# Patient Record
Sex: MALE | Employment: FULL TIME | URBAN - METROPOLITAN AREA
[De-identification: names, ages, dates, MRNs, and addresses within clinical notes are randomized per-mention and may not be internally consistent; named-entity substitution may affect disease eponyms.]

---

## 2017-10-05 ENCOUNTER — OFFICE VISIT (OUTPATIENT)
Dept: INTERNAL MEDICINE CLINIC | Age: 42
End: 2017-10-05

## 2017-10-05 VITALS
BODY MASS INDEX: 25.33 KG/M2 | OXYGEN SATURATION: 98 % | SYSTOLIC BLOOD PRESSURE: 124 MMHG | RESPIRATION RATE: 16 BRPM | HEART RATE: 92 BPM | HEIGHT: 69 IN | WEIGHT: 171 LBS | DIASTOLIC BLOOD PRESSURE: 84 MMHG

## 2017-10-05 DIAGNOSIS — E78.5 DYSLIPIDEMIA: ICD-10-CM

## 2017-10-05 DIAGNOSIS — R03.0 ELEVATED BLOOD PRESSURE READING WITHOUT DIAGNOSIS OF HYPERTENSION: ICD-10-CM

## 2017-10-05 DIAGNOSIS — Z00.00 GENERAL MEDICAL EXAM: ICD-10-CM

## 2017-10-05 DIAGNOSIS — M85.431: Primary | ICD-10-CM

## 2017-10-05 NOTE — PROGRESS NOTES
Coordination of Care Questions    1. Have you been to the ER, urgent care clinic since your last visit? No       Hospitalized since your last visit? No    2. Have you seen or consulted any other health care providers outside of the [address] [place] [address] since your last visit? Include any pap smears or colon screening.  No

## 2017-10-06 LAB
ALBUMIN SERPL-MCNC: 4.8 G/DL (ref 3.5–5.5)
ALBUMIN/GLOB SERPL: 1.5 {RATIO} (ref 1.2–2.2)
ALP SERPL-CCNC: 119 IU/L (ref 39–117)
ALT SERPL-CCNC: 17 IU/L (ref 0–44)
APPEARANCE UR: CLEAR
AST SERPL-CCNC: 18 IU/L (ref 0–40)
BASOPHILS # BLD AUTO: 0 X10E3/UL (ref 0–0.2)
BASOPHILS NFR BLD AUTO: 0 %
BILIRUB SERPL-MCNC: 0.8 MG/DL (ref 0–1.2)
BILIRUB UR QL STRIP: NEGATIVE
BUN SERPL-MCNC: 11 MG/DL (ref 6–24)
BUN/CREAT SERPL: 17 (ref 9–20)
CALCIUM SERPL-MCNC: 9.3 MG/DL (ref 8.7–10.2)
CHLORIDE SERPL-SCNC: 99 MMOL/L (ref 96–106)
CHOLEST SERPL-MCNC: 217 MG/DL (ref 100–199)
CO2 SERPL-SCNC: 24 MMOL/L (ref 18–29)
COLOR UR: YELLOW
CREAT SERPL-MCNC: 0.64 MG/DL (ref 0.76–1.27)
EOSINOPHIL # BLD AUTO: 0 X10E3/UL (ref 0–0.4)
EOSINOPHIL NFR BLD AUTO: 1 %
ERYTHROCYTE [DISTWIDTH] IN BLOOD BY AUTOMATED COUNT: 13.1 % (ref 12.3–15.4)
GLOBULIN SER CALC-MCNC: 3.2 G/DL (ref 1.5–4.5)
GLUCOSE SERPL-MCNC: 89 MG/DL (ref 65–99)
GLUCOSE UR QL: NEGATIVE
HCT VFR BLD AUTO: 48.6 % (ref 37.5–51)
HDLC SERPL-MCNC: 60 MG/DL
HGB BLD-MCNC: 17.1 G/DL (ref 12.6–17.7)
HGB UR QL STRIP: NEGATIVE
IMM GRANULOCYTES # BLD: 0 X10E3/UL (ref 0–0.1)
IMM GRANULOCYTES NFR BLD: 0 %
INTERPRETATION, 910389: NORMAL
KETONES UR QL STRIP: NEGATIVE
LDLC SERPL CALC-MCNC: 137 MG/DL (ref 0–99)
LEUKOCYTE ESTERASE UR QL STRIP: NEGATIVE
LYMPHOCYTES # BLD AUTO: 1.1 X10E3/UL (ref 0.7–3.1)
LYMPHOCYTES NFR BLD AUTO: 22 %
MCH RBC QN AUTO: 30 PG (ref 26.6–33)
MCHC RBC AUTO-ENTMCNC: 35.2 G/DL (ref 31.5–35.7)
MCV RBC AUTO: 85 FL (ref 79–97)
MICRO URNS: NORMAL
MONOCYTES # BLD AUTO: 0.3 X10E3/UL (ref 0.1–0.9)
MONOCYTES NFR BLD AUTO: 5 %
NEUTROPHILS # BLD AUTO: 3.7 X10E3/UL (ref 1.4–7)
NEUTROPHILS NFR BLD AUTO: 72 %
NITRITE UR QL STRIP: NEGATIVE
PH UR STRIP: 6.5 [PH] (ref 5–7.5)
PLATELET # BLD AUTO: 240 X10E3/UL (ref 150–379)
POTASSIUM SERPL-SCNC: 4.1 MMOL/L (ref 3.5–5.2)
PROT SERPL-MCNC: 8 G/DL (ref 6–8.5)
PROT UR QL STRIP: NORMAL
RBC # BLD AUTO: 5.7 X10E6/UL (ref 4.14–5.8)
SODIUM SERPL-SCNC: 141 MMOL/L (ref 134–144)
SP GR UR: 1.02 (ref 1–1.03)
TRIGL SERPL-MCNC: 99 MG/DL (ref 0–149)
UROBILINOGEN UR STRIP-MCNC: 0.2 MG/DL (ref 0.2–1)
VLDLC SERPL CALC-MCNC: 20 MG/DL (ref 5–40)
WBC # BLD AUTO: 5.2 X10E3/UL (ref 3.4–10.8)

## 2017-11-07 ENCOUNTER — TELEPHONE (OUTPATIENT)
Dept: INTERNAL MEDICINE CLINIC | Age: 42
End: 2017-11-07

## 2017-11-07 NOTE — TELEPHONE ENCOUNTER
Patient, requesting clarification regarding rescheduling \"Urinalysis\" test.   Best contact [phone].              From answering service

## 2018-02-19 ENCOUNTER — HOSPITAL ENCOUNTER (OUTPATIENT)
Dept: LAB | Age: 43
Discharge: HOME | End: 2018-02-19

## 2018-02-19 ENCOUNTER — OFFICE VISIT (OUTPATIENT)
Dept: SURGERY | Age: 43
End: 2018-02-19

## 2018-02-19 VITALS
DIASTOLIC BLOOD PRESSURE: 79 MMHG | BODY MASS INDEX: 25.92 KG/M2 | SYSTOLIC BLOOD PRESSURE: 117 MMHG | HEART RATE: 82 BPM | TEMPERATURE: 98.7 F | HEIGHT: 69 IN | RESPIRATION RATE: 14 BRPM | OXYGEN SATURATION: 98 % | WEIGHT: 175 LBS

## 2018-02-19 DIAGNOSIS — D17.21 LIPOMA OF RIGHT UPPER EXTREMITY: Primary | ICD-10-CM

## 2018-02-19 RX ORDER — OXYCODONE AND ACETAMINOPHEN 5; 325 MG/1; MG/1
1 TABLET ORAL
Qty: 30 TAB | Refills: 0 | Status: SHIPPED | OUTPATIENT
Start: 2018-02-19 | End: 2018-12-05 | Stop reason: ALTCHOICE

## 2018-02-19 NOTE — PROGRESS NOTES
1. Have you been to the ER, urgent care clinic since your last visit? Hospitalized since your last visit?no    2. Have you seen or consulted any other health care providers outside of the [address] [place] [address] since your last visit? Include any pap smears or colon screening.  no

## 2018-02-26 NOTE — PROGRESS NOTES
Consult    Subjective:     Jennie De La Cruz is a 43 y.o.  male who is being seen for a tendered subcutaneous mass of his right forearm. Onset of symptoms was gradual and a few years ago with gradually worsening course since that time. The pain is located in his right posterior forearm. Patient describes the pain as continuous and rated as mild. Pain has been associated with nothing. Patient denies fevers, chills and fatigue. Symptoms are aggravated by contact. Symptoms improve with nothing. Previous studies include none. No past medical history on file. Past Surgical History:   Procedure Laterality Date    HX COLONOSCOPY  2/2014    normal     Family History   Problem Relation Age of Onset    Diabetes Father     Cancer Father      polyp colon    Cancer Paternal Grandfather      colon and prostate      Social History   Substance Use Topics    Smoking status: Never Smoker    Smokeless tobacco: Never Used    Alcohol use No       Current Outpatient Prescriptions   Medication Sig Dispense Refill    oxyCODONE-acetaminophen (PERCOCET) 5-325 mg per tablet Take 1 Tab by mouth every four (4) hours as needed for Pain. Max Daily Amount: 6 Tabs. 30 Tab 0    chlorpheniramine-HYDROcodone (TUSSIONEX) 8-10 mg/5 mL suspension Take 5 mL by mouth every twelve (12) hours as needed for Cough. 115 mL 2        No Known Allergies     Review of Systems:  A comprehensive review of systems was negative except for that written in the History of Present Illness. Objective:     Blood pressure 117/79, pulse 82, temperature 98.7 °F (37.1 °C), temperature source Oral, resp. rate 14, height 5' 9\" (1.753 m), weight 175 lb (79.4 kg), SpO2 98 %. Physical Exam:   General appearance: alert, cooperative, no distress, appears stated age  Head: Normocephalic, without obvious abnormality, atraumatic  Eyes: conjunctivae/corneas clear. , EOM's intact.    Extremities: extremities normal, atraumatic, no cyanosis or edema  Skin: Skin color, texture, turgor normal. No rashes, 2 cm subcutaneous mass that is tender on the right posterior arm. It is mobile    Assessment:     43year old with symptomatic lipoma of right posterior.   Would benefit from excision    Plan:     Procedure done in the office     Signed By: April Siegel MD     February 26, 2018

## 2018-02-26 NOTE — PROCEDURES
Procedure Note      Preoperative Diagnosis: Lipoma right arm    Postoperative Diagnosis: Lipoma right arm    Procedure:  Lipoma of right arm      Surgeon:  Murlene Eisenmenger, MD     Assistant:  none    Anesthesia: Local     Procedure Details:      Chart reviewed for the following:   Rogue Habermann, MD, have reviewed the History, Physical and updated the Allergic reactions for Kat performed immediately prior to start of procedure:   Rogue Habermann, MD, have performed the following reviews on Juan Mehta prior to the start of the procedure:            * Patient was identified by name and date of birth   * Agreement on procedure being performed was verified  * Risks and Benefits explained to the patient  * Procedure site verified and marked as necessary  * Patient was positioned for comfort  * Consent was signed and verified     Time: 2:30    Date of procedure: 2/18/2018    The risks,benefits and alternatives  were explained and consent was obtained for the procedure. The area was cleansed with Betadine and draped in the usual manner. Local anesthesia with 1% lidocaine and 0.5% marcaine was infiltrated into the skin overlying the lipoma. An 3 cm  incision was made. The mass was  from the surrounding tissue bluntly. It was removed and sent to pathology. The wound was closed in 2 layers and Dermabond was applied. Estimated Blood Loss:  less than 50 ml    Specimen:  Lipoma             Complications:  None; patient tolerated the procedure well.            Condition: stable           Signed By: Murlene Eisenmenger, MD

## 2018-03-08 ENCOUNTER — OFFICE VISIT (OUTPATIENT)
Dept: SURGERY | Age: 43
End: 2018-03-08

## 2018-03-08 VITALS
DIASTOLIC BLOOD PRESSURE: 74 MMHG | HEIGHT: 69 IN | SYSTOLIC BLOOD PRESSURE: 121 MMHG | HEART RATE: 8 BPM | WEIGHT: 175 LBS | TEMPERATURE: 98.3 F | BODY MASS INDEX: 25.92 KG/M2 | RESPIRATION RATE: 14 BRPM | OXYGEN SATURATION: 98 %

## 2018-03-08 DIAGNOSIS — Z09 POSTOPERATIVE EXAMINATION: Primary | ICD-10-CM

## 2018-03-08 NOTE — PROGRESS NOTES
1. Have you been to the ER, urgent care clinic since your last visit? Hospitalized since your last visit?no    2. Have you seen or consulted any other health care providers outside of the [address] [place] [address] since your last visit? Include any pap smears or colon screening.  no

## 2018-03-15 NOTE — PROGRESS NOTES
SUBJECTIVE: Wesley Garcia is a 43 y.o. male is seen for a routine postop check. Reports no problems with the wound or other issues. Activity, diet and bowels are normal. No pain. OBJECTIVE: Appears well. Wound is well healed without complications or infection. ASSESSMENT: normal postoperative course, doing well. PLAN:  Return PRN.

## 2018-12-05 ENCOUNTER — OFFICE VISIT (OUTPATIENT)
Dept: INTERNAL MEDICINE CLINIC | Age: 43
End: 2018-12-05

## 2018-12-05 VITALS
HEIGHT: 69 IN | BODY MASS INDEX: 25.74 KG/M2 | HEART RATE: 77 BPM | WEIGHT: 173.8 LBS | RESPIRATION RATE: 18 BRPM | SYSTOLIC BLOOD PRESSURE: 120 MMHG | TEMPERATURE: 98 F | OXYGEN SATURATION: 97 % | DIASTOLIC BLOOD PRESSURE: 84 MMHG

## 2018-12-05 DIAGNOSIS — E78.5 DYSLIPIDEMIA: ICD-10-CM

## 2018-12-05 DIAGNOSIS — R03.0 ELEVATED BLOOD PRESSURE READING WITHOUT DIAGNOSIS OF HYPERTENSION: ICD-10-CM

## 2018-12-05 DIAGNOSIS — Z00.00 GENERAL MEDICAL EXAMINATION: Primary | ICD-10-CM

## 2018-12-05 NOTE — PROGRESS NOTES
1. Have you been to the ER, urgent care clinic since your last visit? Hospitalized since your last visit? No    2. Have you seen or consulted any other health care providers outside of the [address] [place] [address] since your last visit? Include any pap smears or colon screening.  No

## 2018-12-05 NOTE — PROGRESS NOTES
Portia Wagoner is here for complete health maintenance physical exam and screening. he does have other concerns. No change diet or exercise    Health maintenance hx includes:  Exercise: not active. Form of exercise: none   Diet: not attempting to follow a low fat, low cholesterol diet  unknown occupation  Cancer screening:    Colon cancer screening:  Last Colonoscopy: never Prostate cancer screening: PSA and/or DIPESH:   No results found for: PSA, PSA2, Ragena Duncans, [id number], [id number], PSALT       Lab Results   Component Value Date/Time    Cholesterol, total 217 (H) 10/05/2017 01:58 PM    HDL Cholesterol 60 10/05/2017 01:58 PM    LDL, calculated 137 (H) 10/05/2017 01:58 PM    VLDL, calculated 20 10/05/2017 01:58 PM    Triglyceride 99 10/05/2017 01:58 PM       Lab Results   Component Value Date/Time    Glucose 89 10/05/2017 01:58 PM         Immunizations:     Immunization History   Administered Date(s) Administered    Influenza High Dose Vaccine PF 11/01/2018    Influenza Vaccine 10/01/2017      Immunization status: up to date and documented.        Social History     Socioeconomic History    Marital status:      Spouse name: Not on file    Number of children: Not on file    Years of education: Not on file    Highest education level: Not on file   Social Needs    Financial resource strain: Not on file    Food insecurity - worry: Not on file    Food insecurity - inability: Not on file    Transportation needs - medical: Not on file   [employer] needs - non-medical: Not on file   Occupational History    Not on file   Tobacco Use    Smoking status: Never Smoker    Smokeless tobacco: Never Used   Substance and Sexual Activity    Alcohol use: No    Drug use: No    Sexual activity: Yes   Other Topics Concern    Not on file   Social History Narrative    Not on file     Past Surgical History:   Procedure Laterality Date    HX COLONOSCOPY  2/2014    normal     Family History Problem Relation Age of Onset    Hypertension Mother     Diabetes Father     Cancer Father         polyp colon    Cancer Paternal Grandfather         colon and prostate     No current outpatient medications on file prior to visit. No current facility-administered medications on file prior to visit. .  Vitals:    12/05/18 1043 12/05/18 1101   BP: 127/88 120/84   Pulse: 77    Resp: 18    Temp: 98 °F (36.7 °C)    TempSrc: Oral    SpO2: 97%    Weight: 173 lb 12.8 oz (78.8 kg)    Height: 5' 9\" (1.753 m)      The physical exam is generally normal. He appears well, alert and oriented x 3, pleasant and cooperative. Vitals as noted. ENT normal, neck supple and free of adenopathy, or masses. No thyromegaly or carotid bruits. Cranial nerves and fundi normal. Lungs are clear to auscultation. Heart sounds are normal, no murmurs, clicks, gallops or rubs. Abdomen is soft, no tenderness, masses or organomegaly. Extremities, peripheral pulses and reflexes are normal.  . Rectal: deferred, not clinically indicated. . Skin is normal without rashes or suspicious lesions. 1. General medical examination    - CBC WITH AUTOMATED DIFF  - LIPID PANEL  - METABOLIC PANEL, COMPREHENSIVE    2.  Dyslipidemia  follow  - LIPID PANEL    3. Elevated blood pressure reading without diagnosis of hypertension  Salt restriction  Home BP advised   diastolic < 80 advised

## 2018-12-06 LAB
ALBUMIN SERPL-MCNC: 4.5 G/DL (ref 3.5–5.5)
ALBUMIN/GLOB SERPL: 1.3 {RATIO} (ref 1.2–2.2)
ALP SERPL-CCNC: 174 IU/L (ref 39–117)
ALT SERPL-CCNC: 51 IU/L (ref 0–44)
AST SERPL-CCNC: 32 IU/L (ref 0–40)
BASOPHILS # BLD AUTO: 0 X10E3/UL (ref 0–0.2)
BASOPHILS NFR BLD AUTO: 0 %
BILIRUB SERPL-MCNC: 0.6 MG/DL (ref 0–1.2)
BUN SERPL-MCNC: 11 MG/DL (ref 6–24)
BUN/CREAT SERPL: 15 (ref 9–20)
CALCIUM SERPL-MCNC: 9.3 MG/DL (ref 8.7–10.2)
CHLORIDE SERPL-SCNC: 102 MMOL/L (ref 96–106)
CHOLEST SERPL-MCNC: 191 MG/DL (ref 100–199)
CO2 SERPL-SCNC: 24 MMOL/L (ref 20–29)
CREAT SERPL-MCNC: 0.71 MG/DL (ref 0.76–1.27)
EOSINOPHIL # BLD AUTO: 0.1 X10E3/UL (ref 0–0.4)
EOSINOPHIL NFR BLD AUTO: 2 %
ERYTHROCYTE [DISTWIDTH] IN BLOOD BY AUTOMATED COUNT: 12.9 % (ref 12.3–15.4)
GLOBULIN SER CALC-MCNC: 3.5 G/DL (ref 1.5–4.5)
GLUCOSE SERPL-MCNC: 84 MG/DL (ref 65–99)
HCT VFR BLD AUTO: 49.6 % (ref 37.5–51)
HDLC SERPL-MCNC: 55 MG/DL
HGB BLD-MCNC: 17 G/DL (ref 13–17.7)
IMM GRANULOCYTES # BLD: 0 X10E3/UL (ref 0–0.1)
IMM GRANULOCYTES NFR BLD: 0 %
INTERPRETATION, 910389: NORMAL
LDLC SERPL CALC-MCNC: 114 MG/DL (ref 0–99)
LYMPHOCYTES # BLD AUTO: 1.3 X10E3/UL (ref 0.7–3.1)
LYMPHOCYTES NFR BLD AUTO: 22 %
MCH RBC QN AUTO: 30.2 PG (ref 26.6–33)
MCHC RBC AUTO-ENTMCNC: 34.3 G/DL (ref 31.5–35.7)
MCV RBC AUTO: 88 FL (ref 79–97)
MONOCYTES # BLD AUTO: 0.3 X10E3/UL (ref 0.1–0.9)
MONOCYTES NFR BLD AUTO: 6 %
NEUTROPHILS # BLD AUTO: 4.2 X10E3/UL (ref 1.4–7)
NEUTROPHILS NFR BLD AUTO: 70 %
PLATELET # BLD AUTO: 263 X10E3/UL (ref 150–379)
POTASSIUM SERPL-SCNC: 4 MMOL/L (ref 3.5–5.2)
PROT SERPL-MCNC: 8 G/DL (ref 6–8.5)
RBC # BLD AUTO: 5.62 X10E6/UL (ref 4.14–5.8)
SODIUM SERPL-SCNC: 142 MMOL/L (ref 134–144)
TRIGL SERPL-MCNC: 110 MG/DL (ref 0–149)
VLDLC SERPL CALC-MCNC: 22 MG/DL (ref 5–40)
WBC # BLD AUTO: 6 X10E3/UL (ref 3.4–10.8)

## 2018-12-09 DIAGNOSIS — R74.8 ABNORMAL LIVER ENZYMES: Primary | ICD-10-CM

## 2018-12-14 LAB
HBV CORE AB SERPL QL IA: NEGATIVE
HBV SURFACE AG SERPL QL IA: NEGATIVE
HCV AB S/CO SERPL IA: <0.1 S/CO RATIO (ref 0–0.9)
SPECIMEN STATUS REPORT, ROLRST: NORMAL

## 2018-12-18 ENCOUNTER — HOSPITAL ENCOUNTER (OUTPATIENT)
Dept: ULTRASOUND IMAGING | Age: 43
Discharge: HOME | End: 2018-12-18
Payer: COMMERCIAL

## 2018-12-18 DIAGNOSIS — R74.8 ABNORMAL LIVER ENZYMES: ICD-10-CM

## 2018-12-18 PROCEDURE — 76705 ECHO EXAM OF ABDOMEN: CPT

## 2019-12-03 ENCOUNTER — OFFICE VISIT (OUTPATIENT)
Dept: INTERNAL MEDICINE CLINIC | Age: 44
End: 2019-12-03

## 2019-12-03 VITALS
TEMPERATURE: 97.6 F | HEIGHT: 69 IN | BODY MASS INDEX: 26.81 KG/M2 | RESPIRATION RATE: 16 BRPM | OXYGEN SATURATION: 98 % | HEART RATE: 64 BPM | DIASTOLIC BLOOD PRESSURE: 87 MMHG | SYSTOLIC BLOOD PRESSURE: 128 MMHG | WEIGHT: 181 LBS

## 2019-12-03 DIAGNOSIS — E78.5 DYSLIPIDEMIA: Primary | ICD-10-CM

## 2019-12-03 DIAGNOSIS — Z00.00 GENERAL MEDICAL EXAMINATION: ICD-10-CM

## 2019-12-03 DIAGNOSIS — E78.5 DYSLIPIDEMIA: ICD-10-CM

## 2019-12-03 DIAGNOSIS — R74.8 ABNORMAL LIVER ENZYMES: ICD-10-CM

## 2019-12-03 NOTE — PROGRESS NOTES
Lefty Quinn is here for complete health maintenance physical exam and screening. he does have other concerns. No change diet or exercise  No supplements  Some weight gain  Had abnormal LFT last year  Not repeated  occ itch but not often  Alk phos elevated from 2015  US Results (most recent):  Results from Hospital Encounter encounter on 12/18/18   US ABD LTD    Narrative EXAM: US ABD LTD    INDICATION: Fatty liver    COMPARISON: None. TECHNIQUE:   Real-time sonography of the abdomen was performed with multiple static images of  the liver, gallbladder, head of pancreas and right kidney. This was a limited  right upper quadrant ultrasound. Ria Lizandro FINDINGS:  The liver is normal in echotexture with no mass or other focal abnormality. The  portal vein images normally with normal direction and velocity of blood flow. The gallbladder is normal and no stones are identified. There is no wall  thickening or fluid around the gallbladder. There is no biliary duct dilatation  and the common duct measures 2 mm in diameter. The head of the pancreas is normal. The right kidney is of normal size and  configuration. Impression IMPRESSION:  1. Normal limited right upper quadrant ultrasound. Health maintenance hx includes:  Exercise: not active.   Form of exercise: none   Diet: not attempting to follow a low fat, low cholesterol diet  unknown occupation  Cancer screening:    Colon cancer screening:  Last Colonoscopy: never Prostate cancer screening: PSA and/or DIPESH:   No results found for: PSA, PSA2, Lonell Avril, PSAR3, [id number], [id number], PSALT       Lab Results   Component Value Date/Time    Cholesterol, total 191 12/05/2018 11:15 AM    HDL Cholesterol 55 12/05/2018 11:15 AM    LDL, calculated 114 (H) 12/05/2018 11:15 AM    VLDL, calculated 22 12/05/2018 11:15 AM    Triglyceride 110 12/05/2018 11:15 AM       Lab Results   Component Value Date/Time    Glucose 84 12/05/2018 11:15 AM Immunizations:     Immunization History   Administered Date(s) Administered    Influenza High Dose Vaccine PF 11/01/2018    Influenza Vaccine 10/01/2017      Immunization status: up to date and documented. Social History     Socioeconomic History    Marital status:      Spouse name: Not on file    Number of children: Not on file    Years of education: Not on file    Highest education level: Not on file   Occupational History    Not on file   Social Needs    Financial resource strain: Not on file    Food insecurity:     Worry: Not on file     Inability: Not on file    Transportation needs:     Medical: Not on file     Non-medical: Not on file   Tobacco Use    Smoking status: Never Smoker    Smokeless tobacco: Never Used   Substance and Sexual Activity    Alcohol use: No    Drug use: No    Sexual activity: Yes   Lifestyle    Physical activity:     Days per week: Not on file     Minutes per session: Not on file    Stress: Not on file   Relationships    Social connections:     Talks on phone: Not on file     Gets together: Not on file     Attends Pentecostalism service: Not on file     Active member of club or organization: Not on file     Attends meetings of clubs or organizations: Not on file     Relationship status: Not on file    Intimate partner violence:     Fear of current or ex partner: Not on file     Emotionally abused: Not on file     Physically abused: Not on file     Forced sexual activity: Not on file   Other Topics Concern    Not on file   Social History Narrative    Not on file     Past Surgical History:   Procedure Laterality Date    HX COLONOSCOPY  2/2014    normal     Family History   Problem Relation Age of Onset    Hypertension Mother     Diabetes Father     Cancer Father         polyp colon    Cancer Paternal Grandfather         colon and prostate     No current outpatient medications on file prior to visit.      No current facility-administered medications on file prior to visit. .  Vitals:    12/03/19 1321   BP: 128/87   Pulse: 64   Resp: 16   Temp: 97.6 °F (36.4 °C)   TempSrc: Oral   SpO2: 98%   Weight: 181 lb (82.1 kg)   Height: 5' 9\" (1.753 m)     The physical exam is generally normal. He appears well, alert and oriented x 3, pleasant and cooperative. Vitals as noted. ENT normal, neck supple and free of adenopathy, or masses. No thyromegaly or carotid bruits. Cranial nerves and fundi normal. Lungs are clear to auscultation. Heart sounds are normal, no murmurs, clicks, gallops or rubs. Abdomen is soft, no tenderness, masses or organomegaly. Extremities, peripheral pulses and reflexes are normal.  . Rectal: deferred, not clinically indicated. . Skin is normal without rashes or suspicious lesions. 1. Dyslipidemia  Labs  Goal ldl < 130  - LIPID PANEL; Future    2. Abnormal liver enzymes  Follow may refer out if needed  - ACTIN (SMOOTH MUSCLE) ANTIBODY; Future  - CERULOPLASMIN; Future  - ALPHA-1-ANTITRYPSIN, TOTAL; Future  - GGT; Future    3. General medical examination  Seems good  - CBC WITH AUTOMATED DIFF; Future  - LIPID PANEL; Future  - METABOLIC PANEL, COMPREHENSIVE;  Future

## 2019-12-03 NOTE — PROGRESS NOTES
1. Have you been to the ER, urgent care clinic since your last visit? Hospitalized since your last visit? No    2. Have you seen or consulted any other health care providers outside of the [address] [place] [address] since your last visit? Include any pap smears or colon screening.  No

## 2019-12-05 LAB
A1AT SERPL-MCNC: 111 MG/DL (ref 101–187)
ACTIN IGG SERPL-ACNC: 10 UNITS (ref 0–19)
ALBUMIN SERPL-MCNC: 4.5 G/DL (ref 3.5–5.5)
ALBUMIN/GLOB SERPL: 1.4 {RATIO} (ref 1.2–2.2)
ALP SERPL-CCNC: 112 IU/L (ref 39–117)
ALT SERPL-CCNC: 29 IU/L (ref 0–44)
AST SERPL-CCNC: 20 IU/L (ref 0–40)
BASOPHILS # BLD AUTO: 0 X10E3/UL (ref 0–0.2)
BASOPHILS NFR BLD AUTO: 0 %
BILIRUB SERPL-MCNC: 0.7 MG/DL (ref 0–1.2)
BUN SERPL-MCNC: 14 MG/DL (ref 6–24)
BUN/CREAT SERPL: 17 (ref 9–20)
CALCIUM SERPL-MCNC: 9.1 MG/DL (ref 8.7–10.2)
CERULOPLASMIN SERPL-MCNC: 24.4 MG/DL (ref 16–31)
CHLORIDE SERPL-SCNC: 101 MMOL/L (ref 96–106)
CHOLEST SERPL-MCNC: 225 MG/DL (ref 100–199)
CO2 SERPL-SCNC: 23 MMOL/L (ref 20–29)
CREAT SERPL-MCNC: 0.83 MG/DL (ref 0.76–1.27)
EOSINOPHIL # BLD AUTO: 0.1 X10E3/UL (ref 0–0.4)
EOSINOPHIL NFR BLD AUTO: 2 %
ERYTHROCYTE [DISTWIDTH] IN BLOOD BY AUTOMATED COUNT: 12.8 % (ref 12.3–15.4)
GGT SERPL-CCNC: 64 IU/L (ref 0–65)
GLOBULIN SER CALC-MCNC: 3.2 G/DL (ref 1.5–4.5)
GLUCOSE SERPL-MCNC: 103 MG/DL (ref 65–99)
HCT VFR BLD AUTO: 48 % (ref 37.5–51)
HDLC SERPL-MCNC: 60 MG/DL
HGB BLD-MCNC: 16.3 G/DL (ref 13–17.7)
IMM GRANULOCYTES # BLD AUTO: 0 X10E3/UL (ref 0–0.1)
IMM GRANULOCYTES NFR BLD AUTO: 0 %
INTERPRETATION, 910389: NORMAL
LDLC SERPL CALC-MCNC: 150 MG/DL (ref 0–99)
LYMPHOCYTES # BLD AUTO: 1.5 X10E3/UL (ref 0.7–3.1)
LYMPHOCYTES NFR BLD AUTO: 33 %
MCH RBC QN AUTO: 30.5 PG (ref 26.6–33)
MCHC RBC AUTO-ENTMCNC: 34 G/DL (ref 31.5–35.7)
MCV RBC AUTO: 90 FL (ref 79–97)
MONOCYTES # BLD AUTO: 0.4 X10E3/UL (ref 0.1–0.9)
MONOCYTES NFR BLD AUTO: 8 %
NEUTROPHILS # BLD AUTO: 2.6 X10E3/UL (ref 1.4–7)
NEUTROPHILS NFR BLD AUTO: 57 %
PLATELET # BLD AUTO: 280 X10E3/UL (ref 150–450)
POTASSIUM SERPL-SCNC: 3.9 MMOL/L (ref 3.5–5.2)
PROT SERPL-MCNC: 7.7 G/DL (ref 6–8.5)
RBC # BLD AUTO: 5.35 X10E6/UL (ref 4.14–5.8)
SODIUM SERPL-SCNC: 140 MMOL/L (ref 134–144)
TRIGL SERPL-MCNC: 75 MG/DL (ref 0–149)
VLDLC SERPL CALC-MCNC: 15 MG/DL (ref 5–40)
WBC # BLD AUTO: 4.6 X10E3/UL (ref 3.4–10.8)

## 2020-03-16 ENCOUNTER — PATIENT MESSAGE (OUTPATIENT)
Dept: INTERNAL MEDICINE CLINIC | Age: 45
End: 2020-03-16

## 2020-03-17 RX ORDER — AMOXICILLIN AND CLAVULANATE POTASSIUM 875; 125 MG/1; MG/1
1 TABLET, FILM COATED ORAL 2 TIMES DAILY
Qty: 14 TAB | Refills: 0 | Status: SHIPPED | OUTPATIENT
Start: 2020-03-17 | End: 2020-03-24

## 2020-03-17 NOTE — TELEPHONE ENCOUNTER
From: Na Nj  To: Sera Miller MD  Sent: 3/16/2020 9:31 PM EDT  Subject: Prescription Question    Hi Dr Jose Miguel Bernard,   I have skin infection on my hand would like to know if you can prescribed antibiotics for me without coming in. Can you call me at [phone] ? You can charge this to my insurance as telephone consultation service. Thanks.   Ming

## 2020-03-24 ENCOUNTER — VIRTUAL VISIT (OUTPATIENT)
Dept: INTERNAL MEDICINE CLINIC | Age: 45
End: 2020-03-24

## 2020-03-24 VITALS — HEART RATE: 70 BPM

## 2020-03-24 DIAGNOSIS — L24.9 IRRITANT CONTACT HAND ECZEMA: Primary | ICD-10-CM

## 2020-03-24 RX ORDER — BETAMETHASONE DIPROPIONATE 0.5 MG/G
OINTMENT TOPICAL 2 TIMES DAILY
Qty: 30 G | Refills: 1 | Status: SHIPPED | OUTPATIENT
Start: 2020-03-24

## 2020-03-24 NOTE — PROGRESS NOTES
Pepe Solitario is a 40 y.o. male who was seen by synchronous (real-time) audio-video technology on 3/24/2020. He was given augmentim 6 days ago with slight improivement  Started after getting strong detergent on hand  Assessment & Plan:           CPT Codes 97849-06255 for Established Patients may apply to this Telehealth Visit  Time-based coding, delete if not needed: I spent at least 15 minutes with the patient and >50% of the time was spent counseling and/or coordinating care regarding rash treatment    Subjective:   Pepe Solitario was seen for No chief complaint on file. Prior to Admission medications    Medication Sig Start Date End Date Taking? Authorizing Provider   amoxicillin-clavulanate (AUGMENTIN) 875-125 mg per tablet Take 1 Tab by mouth two (2) times a day for 7 days. 3/17/20 3/24/20  Arley Russell MD     No Known Allergies    Patient Active Problem List    Diagnosis Date Noted    Prediabetes 11/07/2013    Dyslipidemia 11/07/2013    History of Helicobacter pylori infection 11/07/2013    Elevated blood pressure reading without diagnosis of hypertension 11/07/2013     Current Outpatient Medications   Medication Sig Dispense Refill    amoxicillin-clavulanate (AUGMENTIN) 875-125 mg per tablet Take 1 Tab by mouth two (2) times a day for 7 days. 14 Tab 0          ROS  All else negative      Objective:     General: alert, cooperative, no distress   Mental  status: mental status: alert, oriented to person, place, and time, normal mood, behavior, speech, dress, motor activity, and thought processes   Resp:    Neuro:    Skin: skin: DERMATITIS NOTED: eczematoid dermatitis on right hand with no cellulitis   Diagnoses and all orders for this visit:    1. Irritant contact hand eczema    Other orders  -     betamethasone dipropionate (DIPROLENE) 0.05 % ointment; Apply  to affected area two (2) times a day.       Finish last day of antibiotic  Due to this being a TeleHealth evaluation, many elements of the physical examination are unable to be assessed. We discussed the expected course, resolution and complications of the diagnosis(es) in detail. Medication risks, benefits, costs, interactions, and alternatives were discussed as indicated. I advised him to contact the office if his condition worsens, changes or fails to improve as anticipated. He expressed understanding with the diagnosis(es) and plan. Pursuant to the emergency declaration under the [address] [place] [address] waiver authority and the [employer] and [employer] Act, this Virtual  Visit was conducted, with patient's consent, to reduce the patient's risk of exposure to COVID-19 and provide continuity of care for an established patient. Services were provided through a video synchronous discussion virtually to substitute for in-person clinic visit.     Javed Mix MD

## 2020-11-24 ENCOUNTER — VIRTUAL VISIT (OUTPATIENT)
Dept: INTERNAL MEDICINE CLINIC | Age: 45
End: 2020-11-24
Payer: COMMERCIAL

## 2020-11-24 DIAGNOSIS — L03.012 CELLULITIS OF LEFT MIDDLE FINGER: Primary | ICD-10-CM

## 2020-11-24 PROCEDURE — 99213 OFFICE O/P EST LOW 20 MIN: CPT | Performed by: INTERNAL MEDICINE

## 2020-11-24 RX ORDER — AMOXICILLIN AND CLAVULANATE POTASSIUM 875; 125 MG/1; MG/1
1 TABLET, FILM COATED ORAL EVERY 12 HOURS
Qty: 20 TAB | Refills: 0 | Status: SHIPPED | OUTPATIENT
Start: 2020-11-24 | End: 2020-12-17 | Stop reason: ALTCHOICE

## 2020-11-24 NOTE — PROGRESS NOTES
THIS IS A VIRTUAL VISIT USING [employer] ME SOFTWARE  Recurrent type cellulitis hand had right hand in march now left 3rd finger for 4 weeks using diprolene cream no systemic issues  No injury  No itch  Does IT work      Patient Active Problem List    Diagnosis    Prediabetes    Dyslipidemia    History of Helicobacter pylori infection    Elevated blood pressure reading without diagnosis of hypertension     Exam with picture left middle finger cellulitis with some vesicles and lots of crusting and clear discharge      Diagnoses and all orders for this visit:    1. Cellulitis of left middle finger  -     amoxicillin-clavulanate (AUGMENTIN) 875-125 mg per tablet; Take 1 Tab by mouth every twelve (12) hours. ?  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

## 2020-12-17 ENCOUNTER — OFFICE VISIT (OUTPATIENT)
Dept: INTERNAL MEDICINE CLINIC | Age: 45
End: 2020-12-17
Payer: COMMERCIAL

## 2020-12-17 VITALS
HEIGHT: 69 IN | RESPIRATION RATE: 18 BRPM | DIASTOLIC BLOOD PRESSURE: 83 MMHG | HEART RATE: 85 BPM | BODY MASS INDEX: 25.56 KG/M2 | OXYGEN SATURATION: 95 % | TEMPERATURE: 98 F | SYSTOLIC BLOOD PRESSURE: 123 MMHG | WEIGHT: 172.6 LBS

## 2020-12-17 DIAGNOSIS — Z00.00 GENERAL MEDICAL EXAMINATION: Primary | ICD-10-CM

## 2020-12-17 DIAGNOSIS — E78.5 DYSLIPIDEMIA: ICD-10-CM

## 2020-12-17 DIAGNOSIS — Z23 NEEDS FLU SHOT: ICD-10-CM

## 2020-12-17 DIAGNOSIS — L30.8 OTHER ECZEMA: ICD-10-CM

## 2020-12-17 PROCEDURE — 90471 IMMUNIZATION ADMIN: CPT | Performed by: INTERNAL MEDICINE

## 2020-12-17 PROCEDURE — 90682 RIV4 VACC RECOMBINANT DNA IM: CPT | Performed by: INTERNAL MEDICINE

## 2020-12-17 PROCEDURE — 99396 PREV VISIT EST AGE 40-64: CPT | Performed by: INTERNAL MEDICINE

## 2020-12-17 NOTE — PROGRESS NOTES
Dawit Bradford is here for complete health maintenance physical exam and screening. he does have other concerns. Urine bubbles  Rare  Had abnorrmal  lft 2018    Health maintenance hx includes:  Exercise: moderately active. Form of exercise: walk   Diet: not attempting to follow a low fat, low cholesterol diet  unknown occupation  Cancer screening:    Colon cancer screening:    Prostate cancer screening: PSA and/or DIPESH:        Lab Results   Component Value Date/Time    Cholesterol, total 225 (H) 12/03/2019 02:18 PM    HDL Cholesterol 60 12/03/2019 02:18 PM    LDL, calculated 150 (H) 12/03/2019 02:18 PM    VLDL, calculated 15 12/03/2019 02:18 PM    Triglyceride 75 12/03/2019 02:18 PM       Lab Results   Component Value Date/Time    Glucose 103 (H) 12/03/2019 02:18 PM         Immunizations:     Immunization History   Administered Date(s) Administered    Influenza High Dose Vaccine PF 11/01/2018    Influenza Vaccine 10/01/2017, 10/16/2019      Immunization status: up to date and documented.        Social History     Socioeconomic History    Marital status:      Spouse name: Not on file    Number of children: Not on file    Years of education: Not on file    Highest education level: Not on file   Occupational History    Not on file   Social Needs    Financial resource strain: Not on file    Food insecurity     Worry: Not on file     Inability: Not on file    Transportation needs     Medical: Not on file     Non-medical: Not on file   Tobacco Use    Smoking status: Never Smoker    Smokeless tobacco: Never Used   Substance and Sexual Activity    Alcohol use: No    Drug use: No    Sexual activity: Yes   Lifestyle    Physical activity     Days per week: Not on file     Minutes per session: Not on file    Stress: Not on file   Relationships    Social connections     Talks on phone: Not on file     Gets together: Not on file     Attends Restorationist service: Not on file     Active member of club or organization: Not on file     Attends meetings of clubs or organizations: Not on file     Relationship status: Not on file    Intimate partner violence     Fear of current or ex partner: Not on file     Emotionally abused: Not on file     Physically abused: Not on file     Forced sexual activity: Not on file   Other Topics Concern    Not on file   Social History Narrative    Not on file     Past Surgical History:   Procedure Laterality Date    HX COLONOSCOPY  2/2014    normal     Family History   Problem Relation Age of Onset    Hypertension Mother     Diabetes Father     Cancer Father         polyp colon    Cancer Paternal Grandfather         colon and prostate     Current Outpatient Medications on File Prior to Visit   Medication Sig Dispense Refill    betamethasone dipropionate (DIPROLENE) 0.05 % ointment Apply  to affected area two (2) times a day. 30 g 1     No current facility-administered medications on file prior to visit. .  Vitals:    12/17/20 0925   BP: 123/83   Pulse: 85   Resp: 18   Temp: 98 °F (36.7 °C)   TempSrc: Temporal   SpO2: 95%   Weight: 172 lb 9.6 oz (78.3 kg)   Height: 5' 9\" (1.753 m)     The physical exam is generally normal. He appears well, alert and oriented x 3, pleasant and cooperative. Vitals as noted. ENT normal, neck supple and free of adenopathy, or masses. No thyromegaly or carotid bruits. Cranial nerves and fundi normal. Lungs are clear to auscultation. Heart sounds are normal, no murmurs, clicks, gallops or rubs. Abdomen is soft, no tenderness, masses or organomegaly. Extremities, peripheral pulses and reflexes are normal.  . Rectal: deferred, not clinically indicated. . Skin   Patchy eczema hands some on legs    1. Needs flu shot    - INFLUENZA VIRUS VACCINE, QUADRIVALENT (RIV4), DERIVED FROM RECOMBINANT DNA,HEMAGGLUTININ(HA) PROTEIN ONLY, PF ABX FREE    2. General medical examination  reviewed  - URINALYSIS W/ RFLX MICROSCOPIC;  Future  - CBC WITH AUTOMATED DIFF; Future  - LIPID PANEL; Future  - METABOLIC PANEL, COMPREHENSIVE; Future    3. Dyslipidemia  follow2    4.  Other eczema  Use steroid and moisturizers

## 2020-12-18 LAB
ALBUMIN SERPL-MCNC: 4.7 G/DL (ref 4–5)
ALBUMIN/GLOB SERPL: 1.4 {RATIO} (ref 1.2–2.2)
ALP SERPL-CCNC: 139 IU/L (ref 39–117)
ALT SERPL-CCNC: 17 IU/L (ref 0–44)
APPEARANCE UR: CLEAR
AST SERPL-CCNC: 16 IU/L (ref 0–40)
BASOPHILS # BLD AUTO: 0 X10E3/UL (ref 0–0.2)
BASOPHILS NFR BLD AUTO: 1 %
BILIRUB SERPL-MCNC: 0.8 MG/DL (ref 0–1.2)
BILIRUB UR QL STRIP: NEGATIVE
BUN SERPL-MCNC: 10 MG/DL (ref 6–24)
BUN/CREAT SERPL: 13 (ref 9–20)
CALCIUM SERPL-MCNC: 9.4 MG/DL (ref 8.7–10.2)
CHLORIDE SERPL-SCNC: 103 MMOL/L (ref 96–106)
CHOLEST SERPL-MCNC: 193 MG/DL (ref 100–199)
CO2 SERPL-SCNC: 21 MMOL/L (ref 20–29)
COLOR UR: YELLOW
CREAT SERPL-MCNC: 0.76 MG/DL (ref 0.76–1.27)
EOSINOPHIL # BLD AUTO: 0.2 X10E3/UL (ref 0–0.4)
EOSINOPHIL NFR BLD AUTO: 5 %
ERYTHROCYTE [DISTWIDTH] IN BLOOD BY AUTOMATED COUNT: 12 % (ref 11.6–15.4)
GLOBULIN SER CALC-MCNC: 3.3 G/DL (ref 1.5–4.5)
GLUCOSE SERPL-MCNC: 96 MG/DL (ref 65–99)
GLUCOSE UR QL: NEGATIVE
HCT VFR BLD AUTO: 52.3 % (ref 37.5–51)
HDLC SERPL-MCNC: 54 MG/DL
HGB BLD-MCNC: 18.1 G/DL (ref 13–17.7)
HGB UR QL STRIP: NEGATIVE
IMM GRANULOCYTES # BLD AUTO: 0 X10E3/UL (ref 0–0.1)
IMM GRANULOCYTES NFR BLD AUTO: 0 %
INTERPRETATION, 910389: NORMAL
KETONES UR QL STRIP: NEGATIVE
LDLC SERPL CALC-MCNC: 127 MG/DL (ref 0–99)
LEUKOCYTE ESTERASE UR QL STRIP: NEGATIVE
LYMPHOCYTES # BLD AUTO: 1.2 X10E3/UL (ref 0.7–3.1)
LYMPHOCYTES NFR BLD AUTO: 26 %
MCH RBC QN AUTO: 30.9 PG (ref 26.6–33)
MCHC RBC AUTO-ENTMCNC: 34.6 G/DL (ref 31.5–35.7)
MCV RBC AUTO: 89 FL (ref 79–97)
MICRO URNS: NORMAL
MONOCYTES # BLD AUTO: 0.4 X10E3/UL (ref 0.1–0.9)
MONOCYTES NFR BLD AUTO: 8 %
NEUTROPHILS # BLD AUTO: 2.9 X10E3/UL (ref 1.4–7)
NEUTROPHILS NFR BLD AUTO: 60 %
NITRITE UR QL STRIP: NEGATIVE
PH UR STRIP: 5.5 [PH] (ref 5–7.5)
PLATELET # BLD AUTO: 249 X10E3/UL (ref 150–450)
POTASSIUM SERPL-SCNC: 4 MMOL/L (ref 3.5–5.2)
PROT SERPL-MCNC: 8 G/DL (ref 6–8.5)
PROT UR QL STRIP: NEGATIVE
RBC # BLD AUTO: 5.86 X10E6/UL (ref 4.14–5.8)
SODIUM SERPL-SCNC: 143 MMOL/L (ref 134–144)
SP GR UR: 1.02 (ref 1–1.03)
TRIGL SERPL-MCNC: 66 MG/DL (ref 0–149)
UROBILINOGEN UR STRIP-MCNC: 0.2 MG/DL (ref 0.2–1)
VLDLC SERPL CALC-MCNC: 12 MG/DL (ref 5–40)
WBC # BLD AUTO: 4.8 X10E3/UL (ref 3.4–10.8)

## 2021-08-11 NOTE — PROGRESS NOTES
Manasa Mendoza is a 39 y.o. male with the following history as recorded in [facility]:  Patient Active Problem List    Diagnosis Date Noted    Prediabetes 11/07/2013    Dyslipidemia 11/07/2013    History of Helicobacter pylori infection 11/07/2013    Elevated blood pressure reading without diagnosis of hypertension 11/07/2013       Allergies: Review of patient's allergies indicates no known allergies. No past medical history on file. Past Surgical History:   Procedure Laterality Date    HX COLONOSCOPY  2/2014    normal     Family History   Problem Relation Age of Onset    Diabetes Father     Cancer Father      polyp colon    Cancer Paternal Grandfather      colon and prostate     Social History   Substance Use Topics    Smoking status: Never Smoker    Smokeless tobacco: Not on file    Alcohol use No     Well Adult Physical   Patient here for a comprehensive physical exam.The patient reports problems - chest wall pain  Doing lots of yard workDo you take any herbs or supplements that were not prescribed by a doctor? no Are you taking calcium supplements? no Are you taking aspirin daily? no   History:  Social History   Substance Use Topics    Smoking status: Never Smoker    Smokeless tobacco: None    Alcohol use No     Trigger finger locking compa;at 3rd finger   For months  Chest pain with deep breath from liftng and digging at home     Wt Readings from Last 3 Encounters:   10/05/17 171 lb (77.6 kg)   07/19/16 167 lb (75.8 kg)   01/16/15 168 lb (76.2 kg)     Vitals:    10/05/17 1318   BP: 118/90   Pulse: 92   Resp: 16   SpO2: 98%   Weight: 171 lb (77.6 kg)   Height: 5' 9\" (1.753 m)     BP Readings from Last 3 Encounters:   10/05/17 118/90   07/19/16 120/80   01/16/15 129/88     The physical exam is generally normal. He appears well, alert and oriented x 3, pleasant and cooperative. Vitals as noted. ENT normal, neck supple and free of adenopathy, or masses. No thyromegaly or carotid bruits.  Cranial nerves and fundi normal. Lungs are clear to auscultation. Heart sounds are normal, no murmurs, clicks, gallops or rubs. Abdomen is soft, no tenderness, masses or organomegaly. Extremities, peripheral pulses and reflexes are normal.  . Rectal: deferred, not clinically indicated. . Skin is normal without rashes or suspicious lesions. Has moveable cyst or lipoma forearm      Diagnoses and all orders for this visit:    1. Solitary bone cyst of right forearm  -     REFERRAL TO GENERAL SURGERY        Chest pain atypical likely muscular      General Health good  Lab Results   Component Value Date/Time    Cholesterol, total 196 07/19/2016 10:01 AM    HDL Cholesterol 60 07/19/2016 10:01 AM    LDL, calculated 122 07/19/2016 10:01 AM    VLDL, calculated 14 07/19/2016 10:01 AM    Triglyceride 68 07/19/2016 10:01 AM       Diagnoses and all orders for this visit:    1. Solitary bone cyst of right forearm  -     REFERRAL TO GENERAL SURGERY    2. Elevated blood pressure reading without diagnosis of hypertension    3. Dyslipidemia    4.  General medical exam  -     URINALYSIS W/ RFLX MICROSCOPIC  -     CBC WITH AUTOMATED DIFF  -     LIPID PANEL  -     METABOLIC PANEL, COMPREHENSIVE Tranexamic Acid Counseling:  Patient advised of the small risk of bleeding problems with tranexamic acid. They were also instructed to call if they developed any nausea, vomiting or diarrhea. All of the patient's questions and concerns were addressed.

## 2022-03-19 PROBLEM — L03.012 CELLULITIS OF LEFT MIDDLE FINGER: Status: ACTIVE | Noted: 2020-11-24

## 2023-05-25 RX ORDER — BETAMETHASONE DIPROPIONATE 0.05 %
OINTMENT (GRAM) TOPICAL 2 TIMES DAILY
COMMUNITY
Start: 2020-03-24